# Patient Record
Sex: FEMALE | Race: WHITE | NOT HISPANIC OR LATINO | Employment: OTHER
[De-identification: names, ages, dates, MRNs, and addresses within clinical notes are randomized per-mention and may not be internally consistent; named-entity substitution may affect disease eponyms.]

---

## 2017-04-06 ENCOUNTER — RECORDS - HEALTHEAST (OUTPATIENT)
Dept: ADMINISTRATIVE | Facility: OTHER | Age: 72
End: 2017-04-06

## 2017-04-11 ENCOUNTER — RECORDS - HEALTHEAST (OUTPATIENT)
Dept: ADMINISTRATIVE | Facility: OTHER | Age: 72
End: 2017-04-11

## 2017-04-17 ENCOUNTER — HOSPITAL ENCOUNTER (OUTPATIENT)
Dept: MAMMOGRAPHY | Facility: CLINIC | Age: 72
Discharge: HOME | End: 2017-04-17

## 2017-04-17 DIAGNOSIS — Z12.31 VISIT FOR SCREENING MAMMOGRAM: ICD-10-CM

## 2017-04-20 ENCOUNTER — HOSPITAL ENCOUNTER (OUTPATIENT)
Dept: CT IMAGING | Facility: CLINIC | Age: 72
Discharge: HOME | End: 2017-04-20
Attending: INTERNAL MEDICINE

## 2017-04-20 ENCOUNTER — HOSPITAL ENCOUNTER (OUTPATIENT)
Dept: ULTRASOUND IMAGING | Facility: CLINIC | Age: 72
Discharge: HOME | End: 2017-04-20
Attending: INTERNAL MEDICINE

## 2017-04-20 DIAGNOSIS — R91.8 PULMONARY NODULES: ICD-10-CM

## 2017-04-20 DIAGNOSIS — K82.4 GALLBLADDER POLYP: ICD-10-CM

## 2017-05-03 ENCOUNTER — RECORDS - HEALTHEAST (OUTPATIENT)
Dept: ADMINISTRATIVE | Facility: OTHER | Age: 72
End: 2017-05-03

## 2017-05-08 ENCOUNTER — HOSPITAL ENCOUNTER (OUTPATIENT)
Dept: MRI IMAGING | Facility: CLINIC | Age: 72
Discharge: HOME | End: 2017-05-08
Attending: PODIATRIST

## 2017-05-08 DIAGNOSIS — M25.571 RIGHT ANKLE PAIN, UNSPECIFIED CHRONICITY: ICD-10-CM

## 2017-05-15 ENCOUNTER — HOSPITAL ENCOUNTER (OUTPATIENT)
Dept: ULTRASOUND IMAGING | Facility: CLINIC | Age: 72
Discharge: HOME | End: 2017-05-15

## 2017-05-15 DIAGNOSIS — R22.9 LOCALIZED SWELLING, MASS AND LUMP, UNSPECIFIED: ICD-10-CM

## 2017-05-25 ENCOUNTER — RECORDS - HEALTHEAST (OUTPATIENT)
Dept: ADMINISTRATIVE | Facility: OTHER | Age: 72
End: 2017-05-25

## 2017-06-15 ENCOUNTER — HOSPITAL ENCOUNTER (OUTPATIENT)
Dept: ULTRASOUND IMAGING | Facility: CLINIC | Age: 72
Discharge: HOME | End: 2017-06-15

## 2017-06-15 DIAGNOSIS — E04.1 THYROID NODULE: ICD-10-CM

## 2017-06-16 LAB
LAB AP CHARGES (HE HISTORICAL CONVERSION): NORMAL
LAB AP INITIAL CYTO EVAL (HE HISTORICAL CONVERSION): NORMAL
LAB MED GENERAL PATH INTERP (HE HISTORICAL CONVERSION): NORMAL
PATH REPORT.COMMENTS IMP SPEC: NORMAL
PATH REPORT.FINAL DX SPEC: NORMAL
PATH REPORT.MICROSCOPIC SPEC OTHER STN: NORMAL
PATH REPORT.RELEVANT HX SPEC: NORMAL
SPECIMEN DESCRIPTION: NORMAL

## 2018-04-17 ENCOUNTER — RECORDS - HEALTHEAST (OUTPATIENT)
Dept: ADMINISTRATIVE | Facility: OTHER | Age: 73
End: 2018-04-17

## 2018-04-25 ENCOUNTER — HOSPITAL ENCOUNTER (OUTPATIENT)
Dept: ULTRASOUND IMAGING | Facility: CLINIC | Age: 73
Discharge: HOME | End: 2018-04-25
Attending: INTERNAL MEDICINE

## 2018-04-25 DIAGNOSIS — K82.4 GALLBLADDER POLYP: ICD-10-CM

## 2018-05-10 ENCOUNTER — AMBULATORY - HEALTHEAST (OUTPATIENT)
Dept: SURGERY | Facility: CLINIC | Age: 73
End: 2018-05-10

## 2018-05-10 DIAGNOSIS — K82.4 GALLBLADDER POLYP: ICD-10-CM

## 2018-05-16 ENCOUNTER — OFFICE VISIT - HEALTHEAST (OUTPATIENT)
Dept: SURGERY | Facility: CLINIC | Age: 73
End: 2018-05-16

## 2018-05-16 DIAGNOSIS — K82.4 GALLBLADDER POLYP: ICD-10-CM

## 2018-06-01 ENCOUNTER — HOSPITAL ENCOUNTER (OUTPATIENT)
Dept: MAMMOGRAPHY | Facility: CLINIC | Age: 73
Discharge: HOME | End: 2018-06-01

## 2018-06-01 DIAGNOSIS — Z12.31 VISIT FOR SCREENING MAMMOGRAM: ICD-10-CM

## 2018-06-22 ASSESSMENT — MIFFLIN-ST. JEOR: SCORE: 1281.73

## 2018-06-27 ENCOUNTER — SURGERY - HEALTHEAST (OUTPATIENT)
Dept: SURGERY | Facility: CLINIC | Age: 73
End: 2018-06-27

## 2018-06-27 ENCOUNTER — ANESTHESIA - HEALTHEAST (OUTPATIENT)
Dept: SURGERY | Facility: CLINIC | Age: 73
End: 2018-06-27

## 2018-06-27 ASSESSMENT — MIFFLIN-ST. JEOR: SCORE: 1286.27

## 2019-08-06 ENCOUNTER — RECORDS - HEALTHEAST (OUTPATIENT)
Dept: ADMINISTRATIVE | Facility: OTHER | Age: 74
End: 2019-08-06

## 2021-05-27 ENCOUNTER — RECORDS - HEALTHEAST (OUTPATIENT)
Dept: ADMINISTRATIVE | Facility: CLINIC | Age: 76
End: 2021-05-27

## 2021-05-28 ENCOUNTER — RECORDS - HEALTHEAST (OUTPATIENT)
Dept: ADMINISTRATIVE | Facility: CLINIC | Age: 76
End: 2021-05-28

## 2021-05-29 ENCOUNTER — RECORDS - HEALTHEAST (OUTPATIENT)
Dept: ADMINISTRATIVE | Facility: CLINIC | Age: 76
End: 2021-05-29

## 2021-06-01 VITALS — HEIGHT: 67 IN | BODY MASS INDEX: 26.63 KG/M2

## 2021-06-01 VITALS — HEIGHT: 67 IN | BODY MASS INDEX: 26.53 KG/M2 | WEIGHT: 169 LBS

## 2021-06-18 NOTE — ANESTHESIA CARE TRANSFER NOTE
Last vitals:   Vitals:    06/27/18 1313   BP: 170/80   Pulse: 84   Resp: 27   Temp: 36.2  C (97.1  F)   SpO2: 100%     Patient's level of consciousness is awake  Spontaneous respirations: yes  Maintains airway independently: yes  Dentition unchanged: yes  Oropharynx: oropharynx clear of all foreign objects    QCDR Measures:  ASA# 20 - Surgical Safety Checklist: WHO surgical safety checklist completed prior to induction  PQRS# 430 - Adult PONV Prevention: 4558F - Pt received => 2 anti-emetic agents (different classes) preop & intraop  ASA# 8 - Peds PONV Prevention: 4558F - Pt received => 2 anti-emetic agents (different classes) preop & intraop  PQRS# 424 - Juliana-op Temp Management: 4559F - At least one body temp DOCUMENTED => 35.5C or 95.9F within required timeframe  PQRS# 426 - PACU Transfer Protocol: - Transfer of care checklist used  ASA# 14 - Acute Post-op Pain: ASA14B - Patient did NOT experience pain >= 7 out of 10   The patient is Spont Breathing, responding to commands,  Reflexes  Intact.  VSS

## 2021-06-18 NOTE — PROGRESS NOTES
HPI: Georgina Barbosa is a 72 y.o. female referred to see me by Shira Carrizales DO for enlarging gallbladder polyps.  She has intermittent discomfort in epigastric region but no real food association.  She has had an ultrasound in the past which demonstrated a subcentimeter gallbladder polyp.  However in her recent visit to the emergency room a repeat ultrasound demonstrated at the gallbladder polyp is enlarging.  She denies any symptoms of fevers, chills, nausea or vomiting.    Allergies:Doxycycline monohydrate and Vicryl sutures    Past Medical History:   Diagnosis Date     Disease of thyroid gland      GERD (gastroesophageal reflux disease)        Past Surgical History:   Procedure Laterality Date     ANKLE ARTHROSCOPY Right 4/15/2015    Procedure: RIGHT ANKLE ARTHROSCOPIC DEBRIDEMENT; CARTILAGE REPAIR WITH BIOCARTILAGE GRAFTING; 2ND TOE CORRECTION; HARDWARE REMOVAL OF RIGHT 1ST METATARSALPHALANGEAL JOINT;  Surgeon: Mahesh Amezquita DPM;  Location: [facility];  Service:      FOOT SURGERY Right      HYSTERECTOMY  1985     OOPHORECTOMY Bilateral 1985     URETERAL REIMPLANTION Bilateral        CURRENT MEDS:    Current Outpatient Prescriptions:      calcium, as carbonate, (TUMS) 200 mg calcium (500 mg) chewable tablet, Chew 1-2 tablets every 4 (four) hours as needed for heartburn., Disp: , Rfl:      cholecalciferol, vitamin D3, 1,000 unit capsule, Take 2,000 Units by mouth daily., Disp: , Rfl:      magnesium 200 mg Tab, Take 200 mg by mouth daily. , Disp: , Rfl:      omeprazole (PRILOSEC) 20 MG capsule, Take 20 mg by mouth daily., Disp: , Rfl:      peg 400-propylene glycol PF (SYSTANE) 0.4-0.3 % Dpet, Administer 1 drop to both eyes 3 (three) times a day as needed., Disp: , Rfl:     Family History   Problem Relation Age of Onset     Heart attack Mother      Parkinsonism Father      Diabetes type II Sister         reports that she has quit smoking. She has never used smokeless tobacco. She reports that she does  "not drink alcohol or use illicit drugs.    Review of Systems:  The 12 system review is within normal limits except for as mentioned above.  General ROS: No complaints or constitutional symptoms  Ophthalmic ROS: No complaints of visual changes  Skin: No complaints or symptoms   Endocrine: No complaints or symptoms  Hematologic/Lymphatic: No symptoms or complaints  Psychiatric: No symptoms or complaints  Respiratory ROS: no cough, shortness of breath, or wheezing  Cardiovascular ROS: no chest pain or dyspnea on exertion  Gastrointestinal ROS: As per HPI  Genito-Urinary ROS: no dysuria, trouble voiding, or hematuria  Musculoskeletal ROS: no joint or muscle pain  Neurological ROS: no TIA or stroke symptoms      EXAM:  /80  Pulse 78  Resp 18  Ht 5' 7\" (1.702 m)  LMP 04/17/1985  GENERAL: Well developed female, No acute distress, pleasant and conversant   EYES: Pupils equal, round and reactive, no scleral icterus  CARDIAC: Regular rate and rhythm, no obvious murmurs noted  CHEST/LUNG: Clear to ascultation bilaterally, No ronchi, No wheezes  ABDOMEN: Soft, nontender, nondistended  SKIN: Pink, warm and dry, no obvious rashes or lesions   NEURO:No focal deficits, ambulatory  MUSCULOSKELETAL:No obvious deformities, no swelling, normal appearing      LABS:  Lab Results   Component Value Date    WBC 7.2 10/26/2015    HGB 15.6 10/26/2015    HCT 44.7 10/26/2015    MCV 90 10/26/2015     10/26/2015     INR/Prothrombin Time      Lab Results   Component Value Date    ALT 21 08/25/2015    AST 21 08/25/2015    ALKPHOS 71 08/25/2015    BILITOT 0.5 08/25/2015       IMAGES:   Relevant images were reviewed and discussed with the patient.  Notable findings were:   US ABDOMEN LIMITED  4/25/2018 9:17 AM     INDICATION: Cholesterolosis of gallbladder  COMPARISON: 04/20/2017.     FINDINGS:  GALLBLADDER: A few gallbladder polyps, the largest measuring 11 mm may have slightly enlarged from 04/20/2017 when measured at 9 mm.  BILE " DUCTS: No bile duct dilation. The common bile duct measures 4 mm.  LIVER: 7 mm echogenic focus of the right hepatic lobe causes posterior acoustic shadowing and is consistent with a benign calcified granuloma.  RIGHT KIDNEY: No hydronephrosis.  PANCREAS: Not imaged in detail on this limited study. No incidental abnormalities.     No ascites in the right upper quadrant.     IMPRESSION:   CONCLUSION:  A few gallbladder polyps, the largest measuring 11 mm may have slightly enlarged from 04/20/2017 when measured at 9 mm. Given size, follow-up is recommended.    Assessment/Plan:   Georgina Barbosa is a 72 y.o. female with signs and symptoms consistent with gallbladder polyps which are enlarging.  I have explained the pathophysiology of bladder polyps, increase of malignancy with larger polyps in detail as well as the surgical versus non-operative management strategies.      The risks of surgery were discussed in detail which include, but are not limited to, bleeding, infection, injury to surrounding structures, the need to convert to an open procedure, blood clots, stroke, heart attack and death.  Additionally, the risks of non operative management were discussed which include, but are not limited to, worsening infection, increased pain, sepsis and death.     We discussed an observational strategy with a repeat ultrasound in 6 months to year.  However after careful consideration she would like to consider options and will call into the clinic if she would like to have her gallbladder removed.      Francis Hanna D.O. [facility]  [phone]  [facility] Department of Surgery

## 2021-06-18 NOTE — ANESTHESIA PREPROCEDURE EVALUATION
Anesthesia Evaluation      Patient summary reviewed   No history of anesthetic complications     Airway   Mallampati: II  Neck ROM: full   Pulmonary - negative ROS and normal exam    breath sounds clear to auscultation                         Cardiovascular - negative ROS and normal exam  Exercise tolerance: > or = 4 METS  Rhythm: regular  Rate: normal,         Neuro/Psych - negative ROS     Endo/Other - negative ROS      GI/Hepatic/Renal - negative ROS   (+) hiatal hernia, GERD well controlled,             Dental - normal exam                        Anesthesia Plan  Planned anesthetic: general endotracheal    ASA 2   Induction: intravenous   Anesthetic plan and risks discussed with: patient    Post-op plan: routine recovery

## 2021-06-18 NOTE — ANESTHESIA POSTPROCEDURE EVALUATION
Patient: Georgina Barbosa  LAPAROSCOPIC CHOLECYSTECTOMY  Anesthesia type: general    Patient location: Phase II Recovery  Last vitals:   Vitals:    06/27/18 1500   BP: 143/79   Pulse: 67   Resp: 18   Temp:    SpO2: 95%     Post vital signs: stable  Level of consciousness: awake and responds to simple questions  Post-anesthesia pain: pain controlled  Post-anesthesia nausea and vomiting: no  Pulmonary: unassisted, return to baseline  Cardiovascular: stable and blood pressure at baseline  Hydration: adequate  Anesthetic events: no    QCDR Measures:  ASA# 11 - Juliana-op Cardiac Arrest: ASA11B - Patient did NOT experience unanticipated cardiac arrest  ASA# 12 - Juliana-op Mortality Rate: ASA12B - Patient did NOT die  ASA# 13 - PACU Re-Intubation Rate: ASA13B - Patient did NOT require a new airway mgmt  ASA# 10 - Composite Anes Safety: ASA10A - No serious adverse event    Additional Notes:  Recovery as anticipated from general anesthetic.  No apparent complications.

## 2021-06-26 ENCOUNTER — HEALTH MAINTENANCE LETTER (OUTPATIENT)
Age: 76
End: 2021-06-26

## 2021-07-21 ENCOUNTER — RECORDS - HEALTHEAST (OUTPATIENT)
Dept: ADMINISTRATIVE | Facility: CLINIC | Age: 76
End: 2021-07-21

## 2021-10-16 ENCOUNTER — HEALTH MAINTENANCE LETTER (OUTPATIENT)
Age: 76
End: 2021-10-16

## 2022-07-17 ENCOUNTER — HEALTH MAINTENANCE LETTER (OUTPATIENT)
Age: 77
End: 2022-07-17

## 2022-09-25 ENCOUNTER — HEALTH MAINTENANCE LETTER (OUTPATIENT)
Age: 77
End: 2022-09-25

## 2022-11-03 ENCOUNTER — HOSPITAL ENCOUNTER (OUTPATIENT)
Dept: RADIOLOGY | Facility: CLINIC | Age: 77
Discharge: HOME | End: 2022-11-03
Attending: INTERNAL MEDICINE | Admitting: INTERNAL MEDICINE
Payer: COMMERCIAL

## 2022-11-03 DIAGNOSIS — K44.9 HIATAL HERNIA: ICD-10-CM

## 2022-11-03 DIAGNOSIS — K21.9 CHRONIC GERD: ICD-10-CM

## 2022-11-03 DIAGNOSIS — R13.19 ESOPHAGEAL DYSPHAGIA: ICD-10-CM

## 2022-11-03 PROCEDURE — 255N000001 HC RX 255: Performed by: INTERNAL MEDICINE

## 2022-11-03 PROCEDURE — 74220 X-RAY XM ESOPHAGUS 1CNTRST: CPT

## 2022-11-03 RX ADMIN — ANTACID/ANTIFLATULENT 4 G: 380; 550; 10; 10 GRANULE, EFFERVESCENT ORAL at 09:00

## 2023-12-23 ENCOUNTER — HEALTH MAINTENANCE LETTER (OUTPATIENT)
Age: 78
End: 2023-12-23

## 2025-01-18 ENCOUNTER — HEALTH MAINTENANCE LETTER (OUTPATIENT)
Age: 80
End: 2025-01-18